# Patient Record
Sex: FEMALE | Race: OTHER
[De-identification: names, ages, dates, MRNs, and addresses within clinical notes are randomized per-mention and may not be internally consistent; named-entity substitution may affect disease eponyms.]

---

## 2019-03-31 ENCOUNTER — TRANSCRIPTION ENCOUNTER (OUTPATIENT)
Age: 49
End: 2019-03-31

## 2023-09-05 PROBLEM — Z00.00 ENCOUNTER FOR PREVENTIVE HEALTH EXAMINATION: Status: ACTIVE | Noted: 2023-09-05

## 2023-09-06 ENCOUNTER — APPOINTMENT (OUTPATIENT)
Dept: ULTRASOUND IMAGING | Facility: CLINIC | Age: 53
End: 2023-09-06
Payer: COMMERCIAL

## 2023-09-06 ENCOUNTER — OUTPATIENT (OUTPATIENT)
Dept: OUTPATIENT SERVICES | Facility: HOSPITAL | Age: 53
LOS: 1 days | End: 2023-09-06
Payer: COMMERCIAL

## 2023-09-06 DIAGNOSIS — Z00.8 ENCOUNTER FOR OTHER GENERAL EXAMINATION: ICD-10-CM

## 2023-09-06 PROCEDURE — 76882 US LMTD JT/FCL EVL NVASC XTR: CPT | Mod: 26,RT

## 2023-09-06 PROCEDURE — 76882 US LMTD JT/FCL EVL NVASC XTR: CPT

## 2024-02-16 DIAGNOSIS — R10.2 PELVIC AND PERINEAL PAIN: ICD-10-CM

## 2024-02-20 ENCOUNTER — ASOB RESULT (OUTPATIENT)
Age: 54
End: 2024-02-20

## 2024-02-20 ENCOUNTER — APPOINTMENT (OUTPATIENT)
Dept: OBGYN | Facility: CLINIC | Age: 54
End: 2024-02-20
Payer: COMMERCIAL

## 2024-02-20 VITALS — HEART RATE: 73 BPM | DIASTOLIC BLOOD PRESSURE: 72 MMHG | WEIGHT: 157 LBS | SYSTOLIC BLOOD PRESSURE: 152 MMHG

## 2024-02-20 DIAGNOSIS — D21.9 BENIGN NEOPLASM OF CONNECTIVE AND OTHER SOFT TISSUE, UNSPECIFIED: ICD-10-CM

## 2024-02-20 PROCEDURE — 99203 OFFICE O/P NEW LOW 30 MIN: CPT

## 2024-02-20 PROCEDURE — ZZZZZ: CPT | Mod: 1L

## 2024-02-20 PROCEDURE — 76830 TRANSVAGINAL US NON-OB: CPT

## 2024-04-05 LAB — BACTERIA UR CULT: NORMAL

## 2024-04-05 NOTE — DISCUSSION/SUMMARY
[FreeTextEntry1] : Fibroid uterus  pelvic pain  consultation regarding fibroid uterus confirmed on TV Sonogram  we discussed at length all the options for treatment from IUD, OCP, other hormonal, hysteroscopic myomectomy, ablation, hysterectomy.  perimenopausal symptoms - hot flashes - discussed options and will try Relizen.  patient will consider all her options.

## 2024-04-05 NOTE — HISTORY OF PRESENT ILLNESS
[Patient reported mammogram was normal] : Patient reported mammogram was normal [Patient reported PAP Smear was normal] : Patient reported PAP Smear was normal [Patient reported colonoscopy was normal] : Patient reported colonoscopy was normal [Y] : Positive pregnancy history [Yes] : Patient has concerns regarding sex [Currently Active] : currently active [Men] : men [Mammogramdate] : 11/11/23 [PapSmeardate] : 5/1/23 [ColonoscopyDate] : 10/16/19 [LMPDate] : 10/30/23 [MensesFreq] : 30 [MensesLength] : 5-7 [PGxTotal] : 1 [[facility] [TextBox_6] : 10/30/23 [TextBox_9] : 13 [TextBox_13] : 30 [TextBox_15] : 5-7 [FreeTextEntry1] : 10/30/23

## 2024-05-23 ENCOUNTER — APPOINTMENT (OUTPATIENT)
Dept: ORTHOPEDIC SURGERY | Facility: CLINIC | Age: 54
End: 2024-05-23
Payer: COMMERCIAL

## 2024-05-23 DIAGNOSIS — M65.331 TRIGGER FINGER, RIGHT MIDDLE FINGER: ICD-10-CM

## 2024-05-23 DIAGNOSIS — Z78.9 OTHER SPECIFIED HEALTH STATUS: ICD-10-CM

## 2024-05-23 PROCEDURE — 73140 X-RAY EXAM OF FINGER(S): CPT | Mod: RT

## 2024-05-23 PROCEDURE — 99203 OFFICE O/P NEW LOW 30 MIN: CPT | Mod: 25

## 2024-05-23 PROCEDURE — 20550 NJX 1 TENDON SHEATH/LIGAMENT: CPT | Mod: RT

## 2024-05-23 NOTE — IMAGING
[de-identified] : RIGHT HAND skin intact. no swelling. TTP to MF A1 pulley. IF/MF: good extension, flex to half-fist. RF/SF: good extension, flex to mid-palm. good EPL, FPL. SILT median, ulnar, radial distributions. palpable radial pulse, brisk cap refill all digits. + triggering of MF.

## 2024-05-23 NOTE — HISTORY OF PRESENT ILLNESS
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  + ibuprofen, tylenol, topical ointments without relief.   Hx: Fe deficiency anemia. Kidney stones. Sx: Breast implants s/p removal. . NKDA. [] : no [FreeTextEntry1] : RIGHT middle finger  [FreeTextEntry5] : CANDACE gonzalez [RHD] 53 year old female is here today c/o RIGHT middle finger pain and locking x 6 months w/o injury. pt states she is unable to bend finger w/o forcing it which is painful. no h/o treatment for finger.

## 2024-05-23 NOTE — ASSESSMENT
[FreeTextEntry1] : The condition was explained to the patient. Discussed risks and benefits of treatment options for tenosynovitis - activity modification, NSAID, splint, steroid injection, or surgery. Patient would like to proceed with CSI for trigger finger. - Discussed risks, benefits, and alternatives as well as contents of injection. Risks include, but are not limited allergic reaction, flare reaction, injection site pain, bruising, numbness, increased blood sugar, skin discoloration, fat atrophy, tendon rupture, and infection. Risk of immune suppression and increased susceptibility to infection with steroid use. We discussed that too many injections may lead to weakening of the tendon and tendon rupture. Patient expressed understanding and would like to proceed with injection. - The skin over the RIGHT middle finger A1 pulley was cleansed with alcohol and anesthetized with ethyl chloride. The flexor sheath was injected with 3mg of celestone, 0.5cc of 1% lidocaine. Site was dressed with a band-aid. Patient tolerated the procedure well. - discussed that it may take up to 1 week for symptoms to improve after CSI.  F/u PRN.